# Patient Record
Sex: MALE | NOT HISPANIC OR LATINO | URBAN - NONMETROPOLITAN AREA
[De-identification: names, ages, dates, MRNs, and addresses within clinical notes are randomized per-mention and may not be internally consistent; named-entity substitution may affect disease eponyms.]

---

## 2020-12-07 ENCOUNTER — OFFICE VISIT CONVERTED (OUTPATIENT)
Dept: FAMILY MEDICINE CLINIC | Age: 40
End: 2020-12-07

## 2020-12-07 ENCOUNTER — HOSPITAL ENCOUNTER (OUTPATIENT)
Dept: OTHER | Facility: HOSPITAL | Age: 40
Discharge: HOME | End: 2020-12-07
Attending: NURSE PRACTITIONER

## 2020-12-10 LAB — SARS-COV-2 RNA SPEC QL NAA+PROBE: NOT DETECTED

## 2021-05-18 NOTE — PROGRESS NOTES
Omkar Barrios  1980     Office/Outpatient Visit    Visit Date: Mon, Dec 7, 2020 01:11 pm    Provider: Belen Garcia N.P. (Assistant: Selena Lin, )    Location: [facility]        Electronically signed by Belen Garcia N.P. on  12/07/2020 03:35:24 PM                             Subjective:        CC: Mr. Barrios is a 40 year old male.  headache, stomach pain, diarrhea, dry mouth, fatigue         HPI: patient reports headache, nausea, diarrhea, dizziness, dry heaving, chills, fevers since Saturday. This came on quickly starting with headache. No known sick exposure. No OTC medications.     ROS:     CONSTITUTIONAL:  Positive for chills, fatigue and fever.      EYES:  Negative for blurred vision.      E/N/T:  Positive for nasal congestion, frequent rhinorrhea and sinus pressure.      CARDIOVASCULAR:  Negative for chest pain and palpitations.      RESPIRATORY:  Negative for recent cough and dyspnea.      GASTROINTESTINAL:  Positive for diarrhea, nausea and vomiting.      MUSCULOSKELETAL:  Negative for arthralgias and myalgias.      NEUROLOGICAL:  Positive for dizziness and headaches.   Negative for weakness.      PSYCHIATRIC:  Negative for anxiety and depression.          Past Medical History / Family History / Social History:         Last Reviewed on 12/07/2020 01:39 PM by Belen Garcia    Past Medical History:             PAST MEDICAL HISTORY     UNREMARKABLE         Surgical History:         Positive for    Appendectomy: at age 13; ruptured; ;         Family History:     Father: hypertension     Mother: Medical history unknown     Brother(s): 2 brother(s) total;  PTSD         Social History:     Occupation: [employer];     Marital Status: Single     Children: None     Hobbies/Recreation: he enjoys fishing and television;     Mr. Barrios denies any current form of exercise.          Tobacco/Alcohol/Supplements:     Last Reviewed on 12/07/2020 01:38 PM by Belen Garcia    Tobacco: Current  Smoker: He currently smokes every day, 1 pack per day.          Substance Abuse History:     Last Reviewed on 12/07/2020 01:39 PM by Belen Garcia        Marijuana: Current use.          Mental Health History:     Last Reviewed on 12/07/2020 01:39 PM by Belen Garcia    NEGATIVE         Immunizations:     None        Allergies:     Last Reviewed on 12/07/2020 03:32 PM by Belen Garcia    No Known Allergies.        Current Medications:     Last Reviewed on 12/07/2020 01:36 PM by Belen Garcia    No Known Medications.        Objective:        Vitals:         Current: 12/7/2020 1:16:09 PM    Ht:  6 ft, 1 in;  Wt: 140.9 lbs;  BMI: 18.6T: 99 F;  BP: 128/87 mm Hg (left arm, sitting);  P: 84 bpm (left arm (BP Cuff), sitting)O2 Sat: 98 % (room air)        Exams:     PHYSICAL EXAM:     GENERAL: Vitals recorded well developed, well nourished;  no apparent distress;     EYES: extraocular movements intact; conjunctiva and cornea are normal; PERRLA;     E/N/T: EARS:  normal external auditory canals and tympanic membranes;  grossly normal hearing; NOSE:  normal nasal mucosa, septum, turbinates, and sinuses; OROPHARYNX:  normal mucosa, dentition, gingiva, and posterior pharynx;     RESPIRATORY: normal appearance and symmetric expansion of chest wall; normal respiratory rate and pattern with no distress; normal breath sounds with no rales, rhonchi, wheezes or rubs;     CARDIOVASCULAR: normal rate; rhythm is regular;  no systolic murmur; no edema;     GASTROINTESTINAL: nontender; normal bowel sounds;     LYMPHATIC: no enlargement of cervical or facial nodes;     MUSCULOSKELETAL: normal gait;     NEUROLOGIC: mental status: alert and oriented x 3;     PSYCHIATRIC:  appropriate affect and demeanor; normal speech pattern; grossly normal memory;         Lab/Test Results:         Influenza A and B: Negative (12/07/2020),     Performed by:: mb (12/07/2020),             Assessment:         R53.83   Other fatigue       R11.2   Nausea  with vomiting, unspecified           ORDERS:         Lab Orders:       [id number]  Infectious agent antigen detection by immunoassay; Influenza  (In-House)            51566  Infectious agent antigen detection by immunoassay; Influenza  (In-House)            22015  COVID 19 Testing [facility]  (Send-Out)                      Plan:         Other fatigue    LABORATORY:  Labs ordered to be performed today include COVID 19 Testing.      RECOMMENDATIONS given include: Educated patient that we are awaiting covid-19 test results. During this time quarantine, self isolate and self monitor. Educated to call or report to the ER if having worsening shortness of breath, cough, high fever, or new symptoms. Patient understood these instructions..            Orders:       [id number]  Infectious agent antigen detection by immunoassay; Influenza  (In-House)            [id number]  Infectious agent antigen detection by immunoassay; Influenza  (In-House)            [id number]  COVID 19 Testing [facility]  (Send-Out)              Nausea with vomiting, unspecifiedpatient reports that the vomiting is improved and did not want medication at this time. Instructed to call if worsening nausea and vomiting. Patient will be set up with a PCP visit within 1 month.             Charge Capture:         Primary Diagnosis:     R53.83  Other fatigue           Orders:      [id number]  Office visit - new pt, level 3  (In-House)            20031-03  Infectious agent antigen detection by immunoassay; Influenza  (In-House)            [id number]  Infectious agent antigen detection by immunoassay; Influenza  (In-House)              R11.2  Nausea with vomiting, unspecified

## 2021-07-02 VITALS
OXYGEN SATURATION: 98 % | HEART RATE: 84 BPM | BODY MASS INDEX: 18.67 KG/M2 | TEMPERATURE: 99 F | SYSTOLIC BLOOD PRESSURE: 128 MMHG | HEIGHT: 73 IN | WEIGHT: 140.9 LBS | DIASTOLIC BLOOD PRESSURE: 87 MMHG

## 2024-11-05 ENCOUNTER — HOSPITAL ENCOUNTER (OUTPATIENT)
Dept: GENERAL RADIOLOGY | Facility: HOSPITAL | Age: 44
Discharge: HOME | End: 2024-11-05
Admitting: NURSE PRACTITIONER
Payer: COMMERCIAL

## 2024-11-05 ENCOUNTER — OFFICE VISIT (OUTPATIENT)
Dept: FAMILY MEDICINE CLINIC | Age: 44
End: 2024-11-05
Payer: COMMERCIAL

## 2024-11-05 VITALS
OXYGEN SATURATION: 98 % | BODY MASS INDEX: 18.98 KG/M2 | WEIGHT: 143.2 LBS | HEIGHT: 73 IN | SYSTOLIC BLOOD PRESSURE: 103 MMHG | DIASTOLIC BLOOD PRESSURE: 69 MMHG | HEART RATE: 60 BPM

## 2024-11-05 DIAGNOSIS — Z76.89 ENCOUNTER TO ESTABLISH CARE: Primary | ICD-10-CM

## 2024-11-05 DIAGNOSIS — M25.551 RIGHT HIP PAIN: ICD-10-CM

## 2024-11-05 DIAGNOSIS — C88.40 MALT LYMPHOMA: ICD-10-CM

## 2024-11-05 DIAGNOSIS — C85.99: ICD-10-CM

## 2024-11-05 PROCEDURE — 99203 OFFICE O/P NEW LOW 30 MIN: CPT | Performed by: NURSE PRACTITIONER

## 2024-11-05 PROCEDURE — 73502 X-RAY EXAM HIP UNI 2-3 VIEWS: CPT

## 2024-11-05 NOTE — PROGRESS NOTES
"Chief Complaint  Establish Care, Hip Pain (X 2 weeks /), and Back Pain (X 2 weeks//Pt states that he is unsure of what he did to cause this, pt does state that he coughed and heard a pop closer to his (R) groin area, and pain has been onset since. Pt would just like to make sure he did not pull anything.//)    Subjective          Omkar Barrios presents to [facility]  History of Present Illness  He presents today to establish care.    NHL left eye/MALT lymphoma: He does follow with [facility] oncology. He was treated with 2 rounds of radiation. He is now seeing oncology once yearly. He does follow with his eye doctor every 6 months.     He has been having right hip pain for about 2 weeks. The pain is described as a \"radiating pain that sits there until I move.\" The pain is constant. It has improved since onset. The pain is shooting to his right knee. He does get spasms in his inner right thigh. Standing or sitting in long periods of time make the pain worse. He has tried doing some stretches, but it worsened the pain. He has used a TENS unit, but it didn't help. He has been using ibuprofen, which does help. Lidocaine patches help some as well.       Objective   Vital Signs:   /69 (BP Location: Left arm, Patient Position: Sitting)   Pulse 60   Ht 185.4 cm (73\")   Wt 65 kg (143 lb 3.2 oz)   SpO2 98% Comment: room air  BMI 18.89 kg/m²     Physical Exam  Constitutional:       General: He is not in acute distress.     Appearance: Normal appearance. He is normal weight.   HENT:      Head: Normocephalic.   Eyes:      Pupils: Pupils are equal, round, and reactive to light.      Visual Fields: Right eye visual fields normal and left eye visual fields normal.   Neck:      Trachea: Trachea normal.   Cardiovascular:      Rate and Rhythm: Normal rate and regular rhythm.      Heart sounds: Normal heart sounds.   Pulmonary:      Effort: Pulmonary effort is normal.      Breath sounds: Normal " breath sounds and air entry.   Musculoskeletal:      Right lower leg: No edema.      Left lower leg: No edema.   Skin:     General: Skin is warm and dry.   Neurological:      Mental Status: He is alert and oriented to person, place, and time.   Psychiatric:         Mood and Affect: Mood and affect normal.         Behavior: Behavior normal.         Thought Content: Thought content normal.        Result Review :   The following data was reviewed by: WILSON Corral on 11/05/2024:                  Assessment and Plan    Diagnoses and all orders for this visit:    1. Encounter to establish care (Primary)    2. MALT lymphoma  Assessment & Plan:  Continue follow-up with oncology.      3. Non-Hodgkin's lymphoma of left eye  Assessment & Plan:  Continue follow-up with ophthalmology.      4. Right hip pain  -     XR Hip With or Without Pelvis 2 - 3 View Right; Future    Will obtain an x-ray of his hip today.  May consider referral to Ortho.  He defers PT today.  Will have her return in 1 month for physical and see if this pain is still present.    BMI is within normal parameters. No other follow-up for BMI required.       Follow Up   Return in about 4 weeks (around 12/3/2024) for Annual physical.  Patient was given instructions and counseling regarding his condition or for health maintenance advice. Please see specific information pulled into the AVS if appropriate.

## 2025-01-29 ENCOUNTER — TELEPHONE (OUTPATIENT)
Dept: FAMILY MEDICINE CLINIC | Age: 45
End: 2025-01-29
Payer: COMMERCIAL

## 2025-01-29 NOTE — TELEPHONE ENCOUNTER
*hub to relay*  Pt called to get established with another provider due to Cassidy no longer being in office after February, VM left. Please schedule pt with another provider at their convenience.